# Patient Record
Sex: FEMALE | Race: BLACK OR AFRICAN AMERICAN | NOT HISPANIC OR LATINO | Employment: FULL TIME | URBAN - METROPOLITAN AREA
[De-identification: names, ages, dates, MRNs, and addresses within clinical notes are randomized per-mention and may not be internally consistent; named-entity substitution may affect disease eponyms.]

---

## 2019-07-26 RX ORDER — PROMETHAZINE HYDROCHLORIDE 25 MG/1
TABLET ORAL
Qty: 15 TABLET | Refills: 0 | OUTPATIENT
Start: 2019-07-26

## 2019-10-18 ENCOUNTER — HOSPITAL ENCOUNTER (EMERGENCY)
Facility: HOSPITAL | Age: 39
Discharge: HOME | End: 2019-10-18
Attending: EMERGENCY MEDICINE
Payer: MEDICAID

## 2019-10-18 VITALS
HEIGHT: 68 IN | OXYGEN SATURATION: 100 % | BODY MASS INDEX: 30.31 KG/M2 | RESPIRATION RATE: 19 BRPM | SYSTOLIC BLOOD PRESSURE: 156 MMHG | HEART RATE: 84 BPM | TEMPERATURE: 98 F | WEIGHT: 200 LBS | DIASTOLIC BLOOD PRESSURE: 90 MMHG
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
## 2019-10-18 DIAGNOSIS — S39.012A BACK STRAIN, INITIAL ENCOUNTER: ICD-10-CM

## 2019-10-18 LAB
B-HCG UR QL: NEGATIVE
CTP QC/QA: YES

## 2019-10-18 PROCEDURE — 81025 URINE PREGNANCY TEST: CPT | Performed by: EMERGENCY MEDICINE

## 2019-10-18 PROCEDURE — 99284 EMERGENCY DEPT VISIT MOD MDM: CPT | Mod: 25

## 2019-10-18 RX ORDER — NAPROXEN 375 MG/1
375 TABLET ORAL 2 TIMES DAILY WITH MEALS
Qty: 14 TABLET | Refills: 0 | Status: SHIPPED | OUTPATIENT
Start: 2019-10-18

## 2019-10-18 RX ORDER — LISINOPRIL 2.5 MG/1
2.5 TABLET ORAL DAILY
COMMUNITY
End: 2020-03-08 | Stop reason: SDUPTHER

## 2019-10-18 RX ORDER — METHOCARBAMOL 750 MG/1
750 TABLET, FILM COATED ORAL 3 TIMES DAILY
Qty: 15 TABLET | Refills: 0 | Status: SHIPPED | OUTPATIENT
Start: 2019-10-18 | End: 2019-10-23

## 2019-10-18 RX ORDER — AMLODIPINE BESYLATE 2.5 MG/1
2.5 TABLET ORAL DAILY
COMMUNITY

## 2019-10-18 NOTE — DISCHARGE INSTRUCTIONS
Take medications as directed  Ice every 2 hr for 20 min  Return for any concerns of condition becomes worse  Follow up as directed
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
## 2019-12-15 ENCOUNTER — HOSPITAL ENCOUNTER (EMERGENCY)
Facility: HOSPITAL | Age: 39
Discharge: HOME | End: 2019-12-15
Attending: EMERGENCY MEDICINE
Payer: MEDICAID

## 2019-12-15 VITALS
RESPIRATION RATE: 16 BRPM | TEMPERATURE: 99 F | WEIGHT: 200 LBS | SYSTOLIC BLOOD PRESSURE: 165 MMHG | BODY MASS INDEX: 30.31 KG/M2 | HEIGHT: 68 IN | HEART RATE: 81 BPM | OXYGEN SATURATION: 100 % | DIASTOLIC BLOOD PRESSURE: 104 MMHG

## 2019-12-15 DIAGNOSIS — M25.562 LEFT KNEE PAIN: Primary | ICD-10-CM

## 2019-12-15 PROCEDURE — 25000003 PHARM REV CODE 250: Performed by: EMERGENCY MEDICINE

## 2019-12-15 PROCEDURE — 99283 EMERGENCY DEPT VISIT LOW MDM: CPT | Mod: 25

## 2019-12-15 RX ORDER — IBUPROFEN 600 MG/1
600 TABLET ORAL
Status: COMPLETED | OUTPATIENT
Start: 2019-12-15 | End: 2019-12-15

## 2019-12-15 RX ORDER — HYDROCODONE BITARTRATE AND ACETAMINOPHEN 5; 325 MG/1; MG/1
1 TABLET ORAL EVERY 4 HOURS PRN
Qty: 12 TABLET | Refills: 0 | Status: SHIPPED | OUTPATIENT
Start: 2019-12-15 | End: 2019-12-17

## 2019-12-15 RX ORDER — IBUPROFEN 600 MG/1
600 TABLET ORAL EVERY 6 HOURS PRN
Qty: 20 TABLET | Refills: 0 | Status: SHIPPED | OUTPATIENT
Start: 2019-12-15

## 2019-12-15 RX ADMIN — IBUPROFEN 600 MG: 600 TABLET, FILM COATED ORAL at 03:12

## 2019-12-15 NOTE — ED NOTES
Pt in room 12 for evaluation of left calf pain.  Pt is awake, alert and oriented. Resp even and unlabored. Rubio breath sounds clear.  Pt denies chest pain or sob.  Pt denies injury to leg. There is some tenderness noted to left calf.

## 2019-12-15 NOTE — ED PROVIDER NOTES
Encounter Date: 12/15/2019    SCRIBE #1 NOTE: I, Kyletd Ahuja, am scribing for, and in the presence of, Sharif Ruelas MD.       History     Chief Complaint   Patient presents with    Leg Pain     L leg pain since yesterday. Denies trauma.       Time seen by provider: 2:53 PM on 12/15/2019    Shonda Aguirre is a 39 y.o. female who presents to the ED with a sudden onset of left upper calf pain which worsens with movement for 1 day. Pt denies any recent strenuous activity, including heavy lifting or jumping. She also denies any recent injury, long periods of inactivity, or fever. No PMHx of blood clots. No orthopedic PMHx or PSHx. No SHx of smoking. NKDA.    The history is provided by the patient.     Review of patient's allergies indicates:  No Known Allergies  Past Medical History:   Diagnosis Date    Hypertension      Past Surgical History:   Procedure Laterality Date     SECTION, CLASSIC       History reviewed. No pertinent family history.  Social History     Tobacco Use    Smoking status: Never Smoker   Substance Use Topics    Alcohol use: No    Drug use: No     Review of Systems   Constitutional: Negative for fever.   HENT: Negative for sore throat.    Respiratory: Negative for shortness of breath.    Cardiovascular: Negative for chest pain.   Gastrointestinal: Negative for nausea.   Genitourinary: Negative for dysuria.   Musculoskeletal: Positive for myalgias. Negative for back pain and joint swelling.   Skin: Negative for rash.   Neurological: Negative for weakness.   Hematological: Does not bruise/bleed easily.       Physical Exam     Initial Vitals [12/15/19 1355]   BP Pulse Resp Temp SpO2   (!) 165/104 81 16 98.7 °F (37.1 °C) 100 %      MAP       --         Physical Exam    Nursing note and vitals reviewed.  Constitutional: She appears well-developed and well-nourished.  Non-toxic appearance. No distress.   HENT:   Head: Normocephalic and atraumatic.   Eyes: EOM are normal. Pupils are  equal, round, and reactive to light.   Neck: Normal range of motion. Neck supple. No neck rigidity. No JVD present.   Cardiovascular: Normal rate, regular rhythm, normal heart sounds and intact distal pulses. Exam reveals no gallop and no friction rub.    No murmur heard.  Pulses:       Dorsalis pedis pulses are 2+ on the right side, and 2+ on the left side.        Posterior tibial pulses are 2+ on the right side, and 2+ on the left side.   Pulmonary/Chest: Breath sounds normal. She has no wheezes. She has no rhonchi. She has no rales.   Abdominal: Soft. Bowel sounds are normal. She exhibits no distension. There is no tenderness. There is no rebound and no guarding.   Musculoskeletal:        Left knee: She exhibits decreased range of motion. She exhibits no swelling. No tenderness found.        Left lower leg: She exhibits tenderness. She exhibits no swelling and no edema.   Limited flexion of the left knee to approximately 90° without swelling or palpable tenderness over the front. Mild tenderness of the popliteal area on the left, primarily over the lateral aspect of the knee. No signs of varus or valgus stress. Negative Lachman test. Positive meniscal grind test and Reina test.   Neurological: She is alert and oriented to person, place, and time. She has normal strength and normal reflexes. No cranial nerve deficit or sensory deficit. She exhibits normal muscle tone. Coordination normal. GCS eye subscore is 4. GCS verbal subscore is 5. GCS motor subscore is 6.   Skin: Skin is warm and dry.   Psychiatric: She has a normal mood and affect. Her speech is normal and behavior is normal. She is not actively hallucinating.         ED Course   Procedures  Labs Reviewed - No data to display       Imaging Results          X-Ray Knee 3 View Left (Final result)  Result time 12/15/19 15:18:58    Final result by Kristopher Angeles MD (12/15/19 15:18:58)                 Impression:      As above      Electronically signed  by: Shun Angeles MD  Date:    12/15/2019  Time:    15:18             Narrative:    EXAMINATION:  XR KNEE 3 VIEW LEFT    CLINICAL HISTORY:  Pain in left knee    TECHNIQUE:  AP, lateral, and Merchant views of the left knee were performed.    COMPARISON:  None    FINDINGS:  There is minimal left lateral patellar tilt.  No patellar subluxation.  No radiographically evident acute, displaced fracture or osseous destructive process.  No chondrocalcinosis.  No left knee joint effusion.                                 Medical Decision Making:   History:   Old Medical Records: I decided to obtain old medical records.  Initial Assessment:   39-year-old woman who presents emergency department complaining of left knee pain.  Pain is much worse with weight-bearing or with attempting to flex the knee fully.  Patient has limited full flexion of the knee but is able to fully extend the knee.  There is no swelling erythema or redness of the knee but she does have tenderness over the lateral aspect and popliteal area.  Bedside ultrasound shows no evidence of DVT.  X-ray shows no acute fracture dislocation.  Patient has a positive Reina's test and meniscal grind test without laxity.  I suspect patient has a lateral meniscus tear.  She will be referred to Orthopedics for re-evaluation.  Referral sent to [facility].  Return precautions discussed.  She is discharged improved in no acute distress with a prescription for ibuprofen and Norco.  Independently Interpreted Test(s):   I have ordered and independently interpreted EKG Reading(s) - see prior notes  Clinical Tests:   Radiological Study: Ordered and Reviewed  Medical Tests: Ordered and Reviewed  ED Management:  Bedside ultrasound shows no evidence of DVT or Baker's cyst. Upon further questioning, she admitted to lifting heavy objects a few days prior with following pain.            Scribe Attestation:   Scribe #1: I performed the above scribed service and  the documentation accurately describes the services I performed. I attest to the accuracy of the note.     I, Suraj Don, personally performed the services described in this documentation. All medical record entries made by the scribe were at my direction and in my presence.  I have reviewed the chart and agree that the record reflects my personal performance and is accurate and complete. Sharif Ruelas MD.                      Clinical Impression:       ICD-10-CM ICD-9-CM   1. Left knee pain M25.562 719.46         Disposition:   Disposition: Discharged  Condition: Stable                     Sharif Ruelas MD  12/15/19 8632

## 2020-03-08 ENCOUNTER — HOSPITAL ENCOUNTER (EMERGENCY)
Facility: HOSPITAL | Age: 40
Discharge: HOME | End: 2020-03-08
Attending: EMERGENCY MEDICINE
Payer: MEDICAID

## 2020-03-08 VITALS
HEIGHT: 68 IN | RESPIRATION RATE: 20 BRPM | WEIGHT: 200 LBS | HEART RATE: 73 BPM | OXYGEN SATURATION: 100 % | DIASTOLIC BLOOD PRESSURE: 84 MMHG | TEMPERATURE: 98 F | BODY MASS INDEX: 30.31 KG/M2 | SYSTOLIC BLOOD PRESSURE: 138 MMHG

## 2020-03-08 DIAGNOSIS — G43.001 MIGRAINE WITHOUT AURA AND WITH STATUS MIGRAINOSUS, NOT INTRACTABLE: Primary | ICD-10-CM

## 2020-03-08 LAB
B-HCG UR QL: NEGATIVE
CTP QC/QA: YES

## 2020-03-08 PROCEDURE — 99284 EMERGENCY DEPT VISIT MOD MDM: CPT | Mod: 25

## 2020-03-08 PROCEDURE — 96374 THER/PROPH/DIAG INJ IV PUSH: CPT

## 2020-03-08 PROCEDURE — 25000003 PHARM REV CODE 250: Performed by: EMERGENCY MEDICINE

## 2020-03-08 PROCEDURE — 96375 TX/PRO/DX INJ NEW DRUG ADDON: CPT

## 2020-03-08 PROCEDURE — 96361 HYDRATE IV INFUSION ADD-ON: CPT

## 2020-03-08 PROCEDURE — 81025 URINE PREGNANCY TEST: CPT | Performed by: EMERGENCY MEDICINE

## 2020-03-08 PROCEDURE — 63600175 PHARM REV CODE 636 W HCPCS: Performed by: EMERGENCY MEDICINE

## 2020-03-08 RX ORDER — BUTALBITAL, ACETAMINOPHEN AND CAFFEINE 50; 325; 40 MG/1; MG/1; MG/1
1 TABLET ORAL EVERY 6 HOURS PRN
Qty: 6 TABLET | Refills: 0 | Status: SHIPPED | OUTPATIENT
Start: 2020-03-08 | End: 2020-03-11

## 2020-03-08 RX ORDER — KETOROLAC TROMETHAMINE 30 MG/ML
15 INJECTION, SOLUTION INTRAMUSCULAR; INTRAVENOUS
Status: COMPLETED | OUTPATIENT
Start: 2020-03-08 | End: 2020-03-08

## 2020-03-08 RX ORDER — LISINOPRIL 2.5 MG/1
2.5 TABLET ORAL DAILY
Qty: 30 TABLET | Refills: 0 | Status: SHIPPED | OUTPATIENT
Start: 2020-03-08 | End: 2020-04-07

## 2020-03-08 RX ORDER — PROCHLORPERAZINE EDISYLATE 5 MG/ML
10 INJECTION INTRAMUSCULAR; INTRAVENOUS
Status: COMPLETED | OUTPATIENT
Start: 2020-03-08 | End: 2020-03-08

## 2020-03-08 RX ORDER — LISINOPRIL 10 MG/1
20 TABLET ORAL
Status: COMPLETED | OUTPATIENT
Start: 2020-03-08 | End: 2020-03-08

## 2020-03-08 RX ORDER — METHYLPREDNISOLONE SOD SUCC 125 MG
125 VIAL (EA) INJECTION
Status: COMPLETED | OUTPATIENT
Start: 2020-03-08 | End: 2020-03-08

## 2020-03-08 RX ADMIN — LISINOPRIL 20 MG: 10 TABLET ORAL at 12:03

## 2020-03-08 RX ADMIN — SODIUM CHLORIDE 1000 ML: 0.9 INJECTION, SOLUTION INTRAVENOUS at 12:03

## 2020-03-08 RX ADMIN — KETOROLAC TROMETHAMINE 15 MG: 30 INJECTION, SOLUTION INTRAMUSCULAR; INTRAVENOUS at 12:03

## 2020-03-08 RX ADMIN — METHYLPREDNISOLONE SODIUM SUCCINATE 125 MG: 125 INJECTION, POWDER, FOR SOLUTION INTRAMUSCULAR; INTRAVENOUS at 12:03

## 2020-03-08 RX ADMIN — PROCHLORPERAZINE EDISYLATE 10 MG: 5 INJECTION INTRAMUSCULAR; INTRAVENOUS at 12:03

## 2020-03-08 NOTE — ED NOTES
Pt presents to the ED with complaints of HA which has been ongoing for the past week. She reports associated nausea and states that current HA is consistent with prior migraines. She states that she recently ran out of her Lisinopril. Bed rails are up and call light is within patient reach. Will continue to monitor.

## 2020-03-08 NOTE — ED NOTES
Upon discharge, patient is AAOx4, no cardiac or respiratory complications. Follow up care and  Medications have been reviewed with patient and has been instructed to return to the ER if needed. Patient verbalized understanding and ambulated to the lobby without difficulty. SUJATA MCCANN.

## 2020-03-08 NOTE — ED PROVIDER NOTES
Encounter Date: 3/8/2020    SCRIBE #1 NOTE: IReinier, am scribing for, and in the presence of, William Alegre MD.       History     Chief Complaint   Patient presents with    Headache     x 1 week        Time seen by provider: 11:42 AM on 2020    Shonda Aguirre is a 39 y.o. female with PMHx of HTN and migraines who presents to the ED with an onset of headache beginning x1 week ago. An associated symptoms includes light senstivity. The patient denies any recent events that would bring on this headache, but notes she's had past history of migraines accompanied by nausea and light sensitivity.The patient endorses taking ibuprofen with no resolution. The patient endorses taking her amlodipine for HTN, but notes she's been out of her lisinopril. The patient denies nausea, fever, or any other symptoms at this time. PSHx includes .       The history is provided by the patient and a friend.     Review of patient's allergies indicates:  No Known Allergies  Past Medical History:   Diagnosis Date    Hypertension      Past Surgical History:   Procedure Laterality Date     SECTION, CLASSIC       No family history on file.  Social History     Tobacco Use    Smoking status: Never Smoker   Substance Use Topics    Alcohol use: No    Drug use: No     Review of Systems   Constitutional: Negative for fever.   HENT: Negative for congestion.    Eyes: Positive for photophobia. Negative for visual disturbance.   Respiratory: Negative for wheezing.    Cardiovascular: Negative for chest pain.   Gastrointestinal: Negative for abdominal pain and nausea.   Genitourinary: Negative for dysuria.   Musculoskeletal: Negative for joint swelling.   Skin: Negative for rash.   Neurological: Positive for headaches. Negative for syncope.   Hematological: Does not bruise/bleed easily.   Psychiatric/Behavioral: Negative for confusion.       Physical Exam     Initial Vitals [20 1133]   BP Pulse Resp Temp SpO2   (!)  154/86 85 20 97.9 °F (36.6 °C) 100 %      MAP       --         Physical Exam    Nursing note and vitals reviewed.  Constitutional: She appears well-nourished.   HENT:   Head: Normocephalic and atraumatic.   Eyes: Conjunctivae and EOM are normal.   Neck: Normal range of motion. Neck supple. No thyroid mass present.   Cardiovascular: Normal rate, regular rhythm and normal heart sounds. Exam reveals no gallop and no friction rub.    No murmur heard.  Pulmonary/Chest: Breath sounds normal. She has no wheezes. She has no rhonchi. She has no rales.   Abdominal: Soft. Normal appearance and bowel sounds are normal. There is no tenderness.   Neurological: She is alert and oriented to person, place, and time. She has normal strength. No cranial nerve deficit or sensory deficit.   Skin: Skin is warm and dry. No rash noted. No erythema.   Psychiatric: She has a normal mood and affect. Her speech is normal. Cognition and memory are normal.         ED Course   Procedures  Labs Reviewed - No data to display       Imaging Results    None          Medical Decision Making:   History:   Old Medical Records: I decided to obtain old medical records.  Clinical Tests:   Lab Tests: Ordered and Reviewed  ED Management:  This patient was interviewed and assessed emergently.  Vital signs are stable.  She has no concerning neurologic findings despite complaints of 1 week of headache.  Low suspicion for occult life-threatening pathology including CNS infection, spontaneous intracranial bleed.  Patient provided a migraine cocktail here with significant improvement and she is educated about supportive care for migraines in the outpatient setting.  She will be discharged with a short course of Fioricet and is asked to follow up with her primary care doctor as soon as possible.  She is asked return to the ER immediately for any new, concerning, or worsening symptoms.  Patient is agreeable with this plan for follow-up and she was discharged in  stable condition with a friend as a .  Of note, the patient was requesting a refill on her lisinopril and was provided this.            Scribe Attestation:   Scribe #1: I performed the above scribed service and the documentation accurately describes the services I performed. I attest to the accuracy of the note.    I, Dr. William Alegre, personally performed the services described in this documentation. All medical record entries made by the scribe were at my direction and in my presence.  I have reviewed the chart and agree that the record reflects my personal performance and is accurate and complete. William Alegre MD.  6:48 PM 03/08/2020                        Clinical Impression:       ICD-10-CM ICD-9-CM   1. Migraine without aura and with status migrainosus, not intractable G43.001 346.12         Disposition:   Disposition: Discharged  Condition: Stable                        William Alegre MD  03/08/20 2308

## 2021-04-29 ENCOUNTER — PATIENT MESSAGE (OUTPATIENT)
Dept: RESEARCH | Facility: HOSPITAL | Age: 41
End: 2021-04-29

## 2021-08-04 DIAGNOSIS — R07.9 CHEST PAIN, UNSPECIFIED: Primary | ICD-10-CM

## 2022-01-06 ENCOUNTER — LAB VISIT (OUTPATIENT)
Dept: PRIMARY CARE CLINIC | Facility: OTHER | Age: 42
End: 2022-01-06
Attending: INTERNAL MEDICINE
Payer: MEDICAID

## 2022-01-06 DIAGNOSIS — Z20.822 ENCOUNTER FOR LABORATORY TESTING FOR COVID-19 VIRUS: ICD-10-CM

## 2022-01-06 PROCEDURE — U0003 INFECTIOUS AGENT DETECTION BY NUCLEIC ACID (DNA OR RNA); SEVERE ACUTE RESPIRATORY SYNDROME CORONAVIRUS 2 (SARS-COV-2) (CORONAVIRUS DISEASE [COVID-19]), AMPLIFIED PROBE TECHNIQUE, MAKING USE OF HIGH THROUGHPUT TECHNOLOGIES AS DESCRIBED BY CMS-2020-01-R: HCPCS | Mod: ST120 | Performed by: INTERNAL MEDICINE

## 2022-01-13 LAB — SARS-COV-2 RNA RESP QL NAA+PROBE: NOT DETECTED

## 2022-01-25 ENCOUNTER — LAB VISIT (OUTPATIENT)
Dept: PRIMARY CARE CLINIC | Facility: OTHER | Age: 42
End: 2022-01-25
Attending: INTERNAL MEDICINE
Payer: MEDICAID

## 2022-01-25 DIAGNOSIS — Z20.822 ENCOUNTER FOR LABORATORY TESTING FOR COVID-19 VIRUS: ICD-10-CM

## 2022-01-25 PROCEDURE — U0003 INFECTIOUS AGENT DETECTION BY NUCLEIC ACID (DNA OR RNA); SEVERE ACUTE RESPIRATORY SYNDROME CORONAVIRUS 2 (SARS-COV-2) (CORONAVIRUS DISEASE [COVID-19]), AMPLIFIED PROBE TECHNIQUE, MAKING USE OF HIGH THROUGHPUT TECHNOLOGIES AS DESCRIBED BY CMS-2020-01-R: HCPCS | Performed by: INTERNAL MEDICINE

## 2022-01-26 LAB
SARS-COV-2 RNA RESP QL NAA+PROBE: DETECTED
SARS-COV-2- CYCLE NUMBER: 22

## 2024-03-05 ENCOUNTER — HOSPITAL ENCOUNTER (EMERGENCY)
Facility: HOSPITAL | Age: 44
Discharge: HOME | End: 2024-03-05
Attending: STUDENT IN AN ORGANIZED HEALTH CARE EDUCATION/TRAINING PROGRAM
Payer: MEDICAID

## 2024-03-05 VITALS
HEIGHT: 68 IN | RESPIRATION RATE: 20 BRPM | SYSTOLIC BLOOD PRESSURE: 156 MMHG | WEIGHT: 210 LBS | BODY MASS INDEX: 31.83 KG/M2 | HEART RATE: 66 BPM | TEMPERATURE: 98 F | OXYGEN SATURATION: 100 % | DIASTOLIC BLOOD PRESSURE: 89 MMHG

## 2024-03-05 DIAGNOSIS — R51.9 NONINTRACTABLE HEADACHE, UNSPECIFIED CHRONICITY PATTERN, UNSPECIFIED HEADACHE TYPE: ICD-10-CM

## 2024-03-05 DIAGNOSIS — R07.9 CHEST PAIN: Primary | ICD-10-CM

## 2024-03-05 LAB
ALBUMIN SERPL BCP-MCNC: 4.3 G/DL (ref 3.5–5.2)
ALP SERPL-CCNC: 54 U/L (ref 55–135)
ALT SERPL W/O P-5'-P-CCNC: 11 U/L (ref 10–44)
ANION GAP SERPL CALC-SCNC: 7 MMOL/L (ref 8–16)
AST SERPL-CCNC: 13 U/L (ref 10–40)
B-HCG UR QL: NEGATIVE
BASOPHILS # BLD AUTO: 0.03 K/UL (ref 0–0.2)
BASOPHILS NFR BLD: 0.5 % (ref 0–1.9)
BILIRUB SERPL-MCNC: 0.5 MG/DL (ref 0.1–1)
BILIRUB UR QL STRIP: NEGATIVE
BNP SERPL-MCNC: 48 PG/ML (ref 0–99)
BUN SERPL-MCNC: 11 MG/DL (ref 6–20)
CALCIUM SERPL-MCNC: 9.3 MG/DL (ref 8.7–10.5)
CHLORIDE SERPL-SCNC: 104 MMOL/L (ref 95–110)
CLARITY UR: CLEAR
CO2 SERPL-SCNC: 27 MMOL/L (ref 23–29)
COLOR UR: YELLOW
CREAT SERPL-MCNC: 1.1 MG/DL (ref 0.5–1.4)
CTP QC/QA: YES
DIFFERENTIAL METHOD BLD: ABNORMAL
EOSINOPHIL # BLD AUTO: 0.2 K/UL (ref 0–0.5)
EOSINOPHIL NFR BLD: 4 % (ref 0–8)
ERYTHROCYTE [DISTWIDTH] IN BLOOD BY AUTOMATED COUNT: 12.3 % (ref 11.5–14.5)
EST. GFR  (NO RACE VARIABLE): >60 ML/MIN/1.73 M^2
GLUCOSE SERPL-MCNC: 76 MG/DL (ref 70–110)
GLUCOSE UR QL STRIP: NEGATIVE
HCT VFR BLD AUTO: 37.7 % (ref 37–48.5)
HGB BLD-MCNC: 11.6 G/DL (ref 12–16)
HGB UR QL STRIP: NORMAL
IMM GRANULOCYTES # BLD AUTO: 0.01 K/UL (ref 0–0.04)
IMM GRANULOCYTES NFR BLD AUTO: 0.2 % (ref 0–0.5)
KETONES UR QL STRIP: NEGATIVE
LEUKOCYTE ESTERASE UR QL STRIP: NEGATIVE
LYMPHOCYTES # BLD AUTO: 1.8 K/UL (ref 1–4.8)
LYMPHOCYTES NFR BLD: 32.9 % (ref 18–48)
MAGNESIUM SERPL-MCNC: 1.9 MG/DL (ref 1.6–2.6)
MCH RBC QN AUTO: 28.6 PG (ref 27–31)
MCHC RBC AUTO-ENTMCNC: 30.8 G/DL (ref 32–36)
MCV RBC AUTO: 93 FL (ref 82–98)
MONOCYTES # BLD AUTO: 0.5 K/UL (ref 0.3–1)
MONOCYTES NFR BLD: 8.7 % (ref 4–15)
NEUTROPHILS # BLD AUTO: 3 K/UL (ref 1.8–7.7)
NEUTROPHILS NFR BLD: 53.7 % (ref 38–73)
NITRITE UR QL STRIP: NEGATIVE
NRBC BLD-RTO: 0 /100 WBC
PH UR STRIP: 6 [PH] (ref 5–8)
PLATELET # BLD AUTO: 266 K/UL (ref 150–450)
PMV BLD AUTO: 10.5 FL (ref 9.2–12.9)
POTASSIUM SERPL-SCNC: 3.4 MMOL/L (ref 3.5–5.1)
PROT SERPL-MCNC: 7.6 G/DL (ref 6–8.4)
PROT UR QL STRIP: NEGATIVE
RBC # BLD AUTO: 4.06 M/UL (ref 4–5.4)
SODIUM SERPL-SCNC: 138 MMOL/L (ref 136–145)
SP GR UR STRIP: 1.01 (ref 1–1.03)
TROPONIN I SERPL HS-MCNC: 2.6 PG/ML (ref 0–14.9)
TROPONIN I SERPL HS-MCNC: <2.3 PG/ML (ref 0–14.9)
URN SPEC COLLECT METH UR: NORMAL
UROBILINOGEN UR STRIP-ACNC: NEGATIVE EU/DL
WBC # BLD AUTO: 5.5 K/UL (ref 3.9–12.7)

## 2024-03-05 PROCEDURE — 99285 EMERGENCY DEPT VISIT HI MDM: CPT | Mod: 25

## 2024-03-05 PROCEDURE — 80053 COMPREHEN METABOLIC PANEL: CPT | Performed by: EMERGENCY MEDICINE

## 2024-03-05 PROCEDURE — 93010 ELECTROCARDIOGRAM REPORT: CPT | Mod: ,,, | Performed by: INTERNAL MEDICINE

## 2024-03-05 PROCEDURE — 84484 ASSAY OF TROPONIN QUANT: CPT | Mod: 91 | Performed by: EMERGENCY MEDICINE

## 2024-03-05 PROCEDURE — 83735 ASSAY OF MAGNESIUM: CPT | Performed by: EMERGENCY MEDICINE

## 2024-03-05 PROCEDURE — 81025 URINE PREGNANCY TEST: CPT | Performed by: EMERGENCY MEDICINE

## 2024-03-05 PROCEDURE — 93005 ELECTROCARDIOGRAM TRACING: CPT | Performed by: INTERNAL MEDICINE

## 2024-03-05 PROCEDURE — 81003 URINALYSIS AUTO W/O SCOPE: CPT | Performed by: EMERGENCY MEDICINE

## 2024-03-05 PROCEDURE — 83880 ASSAY OF NATRIURETIC PEPTIDE: CPT | Performed by: EMERGENCY MEDICINE

## 2024-03-05 PROCEDURE — 85025 COMPLETE CBC W/AUTO DIFF WBC: CPT | Performed by: EMERGENCY MEDICINE

## 2024-03-05 NOTE — Clinical Note
"Shonda Nickerson" Timothy was seen and treated in our emergency department on 3/5/2024.  She may return to work on 03/08/2024.       If you have any questions or concerns, please don't hesitate to call.      Albina Mendoza MD"

## 2024-03-05 NOTE — DISCHARGE INSTRUCTIONS
For your chest pain be completed labs, chest x-ray, EKG today that were all within acceptable limits.  Says your having new chest pressure we have referred you to have a stress test within 1-2 days.  Please monitor your phone for a phone call to schedule this.  Please also follow up with your primary care doctor within 2-3 days to discuss your chest pain.  If your chest pain persists, worsens, new symptoms develop then you need to return to the ER immediately    Please keep a log of your blood pressures.  Relax for about 5-10 minutes and then take your blood pressure 1 to 2 times per day different times of the day and keep this log and share with her primary care doctor as you may need to make medication changes with them if your blood pressure is consistently high    Regarding your headaches that you said have become more frequent recently please monitor than and keep a journal of what they feel like and when they occur and what you were doing before and after.  You need to follow up with the primary care doctor regarding your headaches.  As we discussed if your headaches persist or worsen or associated with new symptoms then you may need a CT head.  If there are any changes please return to the ER immediately.  Please discuss this with the primary care doctor as well.

## 2024-03-05 NOTE — FIRST PROVIDER EVALUATION
"Medical screening examination initiated.  I have conducted a focused provider triage encounter, findings are as follows:    Brief history of present illness:  chest pain     Vitals:    03/05/24 1057   BP: (!) 185/112   BP Location: Left arm   Patient Position: Sitting   Pulse: 70   Resp: 18   Temp: 98.1 °F (36.7 °C)   TempSrc: Oral   SpO2: 98%   Weight: 95.3 kg (210 lb)   Height: 5' 8" (1.727 m)       Pertinent physical exam:reports chest pain upon awakening this am       Brief workup plan:  cxr labs EKG     Preliminary workup initiated; this workup will be continued and followed by the physician or advanced practice provider that is assigned to the patient when roomed.  "

## 2024-03-05 NOTE — ED PROVIDER NOTES
Encounter Date: 3/5/2024       History     Chief Complaint   Patient presents with    Chest Pain    Hypertension     HPI    43-year-old presenting with chest pressure starting last night.  Occurring at rest.  Not worse with exertion.  Not associated with any other symptoms.  This concern this is associated with her blood pressure being higher than normal at 160s over 80s/90's at home.  Patient also reporting that she has a history of headaches and has been having similar headaches but thinks that they are becoming more frequent, 2-3 times per week now.  No other changes and headache.  Also says that left ear has been feeling full for years.  Review of patient's allergies indicates:  No Known Allergies  Past Medical History:   Diagnosis Date    Hypertension      Past Surgical History:   Procedure Laterality Date     SECTION, CLASSIC       No family history on file.  Social History     Tobacco Use    Smoking status: Never   Substance Use Topics    Alcohol use: No    Drug use: No     Review of Systems   Constitutional:  Negative for chills and fever.   HENT:  Negative for congestion and sore throat.         L ear fullness   Eyes:  Negative for redness and visual disturbance.   Respiratory:  Negative for cough and shortness of breath.    Cardiovascular:  Positive for chest pain. Negative for palpitations and leg swelling.   Gastrointestinal:  Negative for abdominal pain, blood in stool, constipation, diarrhea, nausea and vomiting.   Genitourinary:  Negative for dysuria, frequency and hematuria.   Musculoskeletal:  Negative for back pain, joint swelling, neck pain and neck stiffness.   Skin:  Negative for rash and wound.   Neurological:  Positive for headaches. Negative for weakness and numbness.   Psychiatric/Behavioral:  Negative for confusion.        Physical Exam     Initial Vitals [24 1057]   BP Pulse Resp Temp SpO2   (!) 185/112 70 18 98.1 °F (36.7 °C) 98 %      MAP       --         Physical  Exam    Nursing note and vitals reviewed.  Constitutional: She appears well-developed. She is not diaphoretic.   HENT:   Head: Normocephalic.   Right Ear: External ear normal.   Left Ear: External ear normal.   Mouth/Throat: Oropharynx is clear and moist. No oropharyngeal exudate.   TM within acceptable limits bilat   Eyes: Conjunctivae and EOM are normal. Pupils are equal, round, and reactive to light. Right eye exhibits no discharge. Left eye exhibits no discharge. No scleral icterus.   Neck: Neck supple. No tracheal deviation present.   Cardiovascular:  Normal rate and regular rhythm.           Pulmonary/Chest: Breath sounds normal. No stridor. No respiratory distress. She has no wheezes. She has no rhonchi. She has no rales. She exhibits no tenderness.   Abdominal: Abdomen is soft. She exhibits no distension. There is no abdominal tenderness. There is no rebound and no guarding.   Musculoskeletal:         General: No edema.      Cervical back: Neck supple.     Neurological: She is alert and oriented to person, place, and time.   Skin: Skin is warm and dry.         ED Course   Procedures  Labs Reviewed   CBC W/ AUTO DIFFERENTIAL - Abnormal; Notable for the following components:       Result Value    Hemoglobin 11.6 (*)     MCHC 30.8 (*)     All other components within normal limits   COMPREHENSIVE METABOLIC PANEL - Abnormal; Notable for the following components:    Potassium 3.4 (*)     Alkaline Phosphatase 54 (*)     Anion Gap 7 (*)     All other components within normal limits   MAGNESIUM   TROPONIN I HIGH SENSITIVITY   TROPONIN I HIGH SENSITIVITY   B-TYPE NATRIURETIC PEPTIDE   URINALYSIS, REFLEX TO URINE CULTURE    Narrative:     Specimen Source->Urine   POCT URINE PREGNANCY        ECG Results              EKG 12-lead (In process)        Collection Time Result Time QRS Duration OHS QTC Calculation    03/05/24 11:02:42 03/05/24 11:12:10 82 431                     In process by Interface, Lab In [facility]  (03/05/24 11:12:18)                   Narrative:    Test Reason : R07.9,    Vent. Rate : 068 BPM     Atrial Rate : 068 BPM     P-R Int : 144 ms          QRS Dur : 082 ms      QT Int : 406 ms       P-R-T Axes : 068 061 036 degrees     QTc Int : 431 ms    Normal sinus rhythm  Nonspecific T wave abnormality  Abnormal ECG  No previous ECGs available    Referred By:             Confirmed By:                                   Imaging Results              X-Ray Chest PA And Lateral (Final result)  Result time 03/05/24 11:27:06   Procedure changed from X-Ray Chest AP Portable     Final result by Fazal Caicedo MD (03/05/24 11:27:06)                   Narrative:    Reason: Chest Pain    FINDINGS:    PA and lateral chest without comparisons show normal cardiomediastinal silhouette.  Lungs are clear. Pulmonary vasculature is normal. No acute osseous abnormality.    IMPRESSION:    No acute cardiopulmonary abnormality.    Electronically signed by:  Fazal Caicedo DO  03/05/2024 11:27 AM CST Workstation: [id number]                                     Medications - No data to display  Medical Decision Making  Amount and/or Complexity of Data Reviewed  Radiology: ordered.    43-year-old presenting with chest pressure starting last night.  Occurring at rest.  Not worse with exertion.  Not associated with any other symptoms.  This concern this is associated with her blood pressure being higher than normal at 160s over 80s/90's at home.  Patient also reporting that she has a history of headaches and has been having similar headaches but thinks that they are becoming more frequent, 2-3 times per week now.  No other changes and headache.  Also says that left ear has been feeling full for years.    Vitals within acceptable limits on presentation    Differential includes ACS, PE, costochondritis, shingles, GERD, pneumonia, pneumothorax    Clinical presentation required evaluation for ACS although low clinical suspicion.  CBC, CMP, Mag,  urinalysis, BNP, urine pregnancy, troponin x2, EKG, chest x-ray within acceptable limits.  Patient with low heart score.  No indication for further evaluation or admission for chest pain at this time but did schedule patient for outpatient stress test within 1-2 days discussed at length follow up with primary care doctor as well as monitor her symptoms and strict return precautions.  Considered PE but patient with no shortness of breath, low clinical suspicion, low Wells score, perc neg therefore no further workup for PE at this time.  Unclear etiology of patient's chest pain but low suspicion for emergent etiology at this time.  Regarding patient's headaches, similar to previous but becoming more frequent.  Gradual in onset, never thunderclap, never worst headache of life, no neuro symptoms or signs, no neck pain, no fever, patient not over 50, does not worsened with positional change or Valsalva, no history of cancer, no immunosuppression, no night sweats, not nightly headaches.  No red flags therefore discussed CT head but this time patient opts to continue to monitor, keep a log, we will follow up with primary care and Neurology.  Strict return precautions discussed.  Albina Mendoza MD  Emergency Medicine Staff Physician  4:05 PM                                     Clinical Impression:  Final diagnoses:  [R07.9] Chest pain (Primary)  [R51.9] Nonintractable headache, unspecified chronicity pattern, unspecified headache type          ED Disposition Condition    Discharge Stable          ED Prescriptions    None       Follow-up Information       Follow up With Specialties Details Why Contact Info Additional Information    [facility] - Emergency Dept Emergency Medicine Go to  Return to an emergency department immediately if you develop persisting or worsening symptoms or with any new symptoms such as fevers, chills, inability to eat or drink, uncontrollable pain, headaches, chagnes in vision, nausea,  vomiting, stomach pain [address] [facility] [id number]  [phone] 1st floor             Albina Mendoza MD  03/05/24 0986

## 2024-03-11 ENCOUNTER — TELEPHONE (OUTPATIENT)
Dept: CARDIOLOGY | Facility: HOSPITAL | Age: 44
End: 2024-03-11

## 2024-03-12 ENCOUNTER — HOSPITAL ENCOUNTER (OUTPATIENT)
Dept: RADIOLOGY | Facility: HOSPITAL | Age: 44
Discharge: HOME | End: 2024-03-12
Attending: STUDENT IN AN ORGANIZED HEALTH CARE EDUCATION/TRAINING PROGRAM
Payer: MEDICAID

## 2024-03-12 ENCOUNTER — CLINICAL SUPPORT (OUTPATIENT)
Dept: CARDIOLOGY | Facility: HOSPITAL | Age: 44
End: 2024-03-12
Attending: STUDENT IN AN ORGANIZED HEALTH CARE EDUCATION/TRAINING PROGRAM
Payer: MEDICAID

## 2024-03-12 DIAGNOSIS — R07.9 CHEST PAIN: ICD-10-CM

## 2024-03-12 PROCEDURE — A9502 TC99M TETROFOSMIN: HCPCS | Performed by: STUDENT IN AN ORGANIZED HEALTH CARE EDUCATION/TRAINING PROGRAM

## 2024-03-12 PROCEDURE — 93016 CV STRESS TEST SUPVJ ONLY: CPT | Mod: ,,, | Performed by: GENERAL PRACTICE

## 2024-03-12 PROCEDURE — 93017 CV STRESS TEST TRACING ONLY: CPT

## 2024-03-12 PROCEDURE — 78452 HT MUSCLE IMAGE SPECT MULT: CPT | Mod: TC

## 2024-03-12 PROCEDURE — 93018 CV STRESS TEST I&R ONLY: CPT | Mod: ,,, | Performed by: GENERAL PRACTICE

## 2024-03-12 RX ADMIN — TETROFOSMIN 25 MILLICURIE: 1.38 INJECTION, POWDER, LYOPHILIZED, FOR SOLUTION INTRAVENOUS at 09:03

## 2024-03-12 RX ADMIN — TETROFOSMIN 11.4 MILLICURIE: 1.38 INJECTION, POWDER, LYOPHILIZED, FOR SOLUTION INTRAVENOUS at 07:03

## 2024-03-12 NOTE — NURSING NOTE
Nuclear Treadmill Stress Test completed without complications. EKG changes reported to NP.Patient verbalized understanding of pre-post test instructions. Test supervision and discharge from lab per Kati Orr NP.

## 2024-03-13 LAB
CV STRESS BASE HR: 73 BPM
DIASTOLIC BLOOD PRESSURE: 85 MMHG
OHS CV CPX 1 MINUTE RECOVERY HEART RATE: 116 BPM
OHS CV CPX 85 PERCENT MAX PREDICTED HEART RATE MALE: 151
OHS CV CPX ESTIMATED METS: 9.8
OHS CV CPX MAX PREDICTED HEART RATE: 178
OHS CV CPX PATIENT IS FEMALE: 1
OHS CV CPX PATIENT IS MALE: 0
OHS CV CPX PEAK DIASTOLIC BLOOD PRESSURE: 92 MMHG
OHS CV CPX PEAK HEAR RATE: 165 BPM
OHS CV CPX PEAK RATE PRESSURE PRODUCT: NORMAL
OHS CV CPX PEAK SYSTOLIC BLOOD PRESSURE: 168 MMHG
OHS CV CPX PERCENT MAX PREDICTED HEART RATE ACHIEVED: 98
OHS CV CPX RATE PRESSURE PRODUCT PRESENTING: 9198
STRESS ECHO POST EXERCISE DUR MIN: 7 MINUTES
STRESS ECHO POST EXERCISE DUR SEC: 39 SECONDS
STRESS ST DEPRESSION: 1 MM
SYSTOLIC BLOOD PRESSURE: 126 MMHG

## 2024-03-18 DIAGNOSIS — M54.6 PAIN IN THORACIC SPINE: Primary | ICD-10-CM

## 2024-03-25 LAB
OHS QRS DURATION: 82 MS
OHS QTC CALCULATION: 431 MS